# Patient Record
Sex: FEMALE | Race: WHITE
[De-identification: names, ages, dates, MRNs, and addresses within clinical notes are randomized per-mention and may not be internally consistent; named-entity substitution may affect disease eponyms.]

---

## 2017-03-29 ENCOUNTER — HOSPITAL ENCOUNTER (OUTPATIENT)
Dept: HOSPITAL 61 - PCVCIMAG | Age: 47
Discharge: HOME | End: 2017-03-29
Attending: INTERNAL MEDICINE
Payer: COMMERCIAL

## 2017-03-29 DIAGNOSIS — Z95.1: ICD-10-CM

## 2017-03-29 DIAGNOSIS — Z95.5: ICD-10-CM

## 2017-03-29 DIAGNOSIS — I25.10: Primary | ICD-10-CM

## 2017-03-29 PROCEDURE — 93325 DOPPLER ECHO COLOR FLOW MAPG: CPT

## 2017-03-29 PROCEDURE — 93351 STRESS TTE COMPLETE: CPT

## 2018-08-13 ENCOUNTER — HOSPITAL ENCOUNTER (OUTPATIENT)
Dept: HOSPITAL 61 - PCVCIMAG | Age: 48
Discharge: HOME | End: 2018-08-13
Attending: INTERNAL MEDICINE
Payer: COMMERCIAL

## 2018-08-13 DIAGNOSIS — I25.10: Primary | ICD-10-CM

## 2018-08-13 PROCEDURE — A9500 TC99M SESTAMIBI: HCPCS

## 2018-08-13 PROCEDURE — 93017 CV STRESS TEST TRACING ONLY: CPT

## 2018-08-13 PROCEDURE — 78452 HT MUSCLE IMAGE SPECT MULT: CPT

## 2018-08-13 NOTE — PCVCIMAG
--------------- APPROVED REPORT --------------





Imaging Protocol: Rest Tc-99m/Stress Tc-99m 1 day

Study performed:  08/13/2018 08:57:37



Indication: CAD, CARDIOMYOPATHY, FACTOR V LEIDEN, FATIGUE

Patient Location: Out-Patient

Stress Nurse: Elvia Lopez RN, Eleonora Plascencia RN

NM Tech:Shenamehran Melo [facility]



Ht: 5 ft 3 in Wt: 125 lbs BSA:  1.58 m2

HR: 61 bpm                      BP: 117/55 mmHg         BMI:  

22.1

Rhythm:  SR



Medical History

Medical History: CARDIOMYOPATHY, CAD, FATIGUE, FACTOR V 

LEIDEN

Medications: COUMADIN, XANAX, ATORVASTATIN, GABAPENTIN, LOSARTAN, 

METOPROLOL, KCL

Allergies: PCN, SAS, VALIUM

Previous Cardiac Procedures: CABG 2010

Pretest Chest Pain Characteristics: No chest pain

Exercise History: Physically active

Meds Held (24 hrs): METOPROLOL



Resting Data

Rest SPECT myocardial perfusion imaging was performed in supine 

position 60 minutes following the intravenous injection of 10.5 mCi 

of Tc-99m Sestamibi.

Time of rest injection: 0800     Date: 08/13/2018

Administration Route: IV

Administration Site: Left Hand



Pharmacologic Stress

Pharmacologic stress test was performed by injecting Regadenoson 0.4 

mg IV push over 10-15 seconds immediately followed by the intravenous 

injection of 31.5 mCi of Tc-99m Sestamibi.

Time of stress injection: 31.5     Date: 08/13/2018

Administration Route: IV

Administration Site: Left Hand

Gated Stress SPECT was performed 60 minutes after stress 

injection.

The images were gated to evaluate regional wall motion and calculate 

left ventricular ejection fraction. 



Stress Test Details

Stress Test:  Pharmacologic stress testing performed using 0.4 mg of 

regadenoson per 5 mL given IV over 10 seconds and was paired with low 

level exercise.

  Reason for pharmacologic stress test: physical limitation.



HRMax Heart Rate (APMHR): 172 bpm 

Resting HR:            61 bpmTarget HR (85% APMHR): 146 bpm

Max HR Achieved:  100 bpm

% of APMHR:         58

Recovery HR:            60 bpm



BP

Resting BP:  117/55 mmHg



Recovery BP:       117/53 mmHg

ECG

Resting ECG:  SR

Stress ECG:     SR/ST, Sinus Rhythm, nonspecific ST-T 

abnormalities

ST Change: SR

Arrhythmia:    NONE

Recovery ECG: Sinus Rhythm



Clinical

Reason for Termination: Completed protocol

Stress Symptoms: TINGLING OVER ALL

PATIENT RECOVERED WITH CAFFEINE



Stress ECG Conclusion

ECG: Non-ischemic



Study Quality

Study: Fair



Study Data

Post stress, the left ventricular ejection was 58%..

SSS: 9

SRS: 7

SDS: 3

TID = 1.28.



Perfusion

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 

ventricle with no nadia-infarct ischemia. 

Small sized area of moderate reversible ischemia involving the mid 

inferolateral left ventricle consistent with a circumflex 

distribution.



Wall Motion

Normal left ventricular size and function with no regional wall 

motion abnormalities.



Nuclear Conclusion

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 

ventricle with no nadia-infarct ischemia. 

Small sized area of moderate reversible ischemia involving the mid 

inferolateral left ventricle consistent with a circumflex 

distribution.

Normal left ventricular size and function with no regional wall 

motion abnormalities.

Post stress, the left ventricular ejection was 58%. 

No prior study available for comparison.



Interpreted by:  Charlie Puentes MD

Electronically Approved: 08/13/2018 

13:43:38



&lt;Conclusion&gt;

ECG: Non-ischemic

## 2019-01-30 ENCOUNTER — HOSPITAL ENCOUNTER (OUTPATIENT)
Dept: HOSPITAL 61 - PCVCIMAG | Age: 49
Discharge: HOME | End: 2019-01-30
Attending: INTERNAL MEDICINE
Payer: COMMERCIAL

## 2019-01-30 DIAGNOSIS — I42.9: ICD-10-CM

## 2019-01-30 DIAGNOSIS — R07.9: ICD-10-CM

## 2019-01-30 DIAGNOSIS — I08.1: Primary | ICD-10-CM

## 2019-01-30 DIAGNOSIS — Z95.1: ICD-10-CM

## 2019-01-30 DIAGNOSIS — R09.89: ICD-10-CM

## 2019-01-30 DIAGNOSIS — R10.9: ICD-10-CM

## 2019-01-30 DIAGNOSIS — I10: ICD-10-CM

## 2019-01-30 DIAGNOSIS — I25.10: ICD-10-CM

## 2019-01-30 PROCEDURE — 76700 US EXAM ABDOM COMPLETE: CPT

## 2019-01-30 PROCEDURE — 93306 TTE W/DOPPLER COMPLETE: CPT

## 2019-01-30 NOTE — PCVCIMAG
EXAM: ABDOMINAL ULTRASOUND COMPLETE



INDICATION: Abdominal pain



FINDINGS: 

Gallbladder: No gallstones. No wall thickening or abnormal

pericholecystic fluid.  Gallbladder is somewhat small and contracted.

Liver: Normal in size measuring 13.4 cm in length. No focal masses.

Bile ducts: No intra or extra hepatic bile duct dilatation. The common

bile duct measures 4.5 mm.

Pancreas: Unremarkable where seen.

Spleen: Normal in size measuring 9.8 cm in greatest dimension. No

focal masses.

Right kidney: No hydronephrosis. Length measures 9.0 cm.

Left kidney: No hydronephrosis. Length measures 9.0 cm.

Inferior vena cava: Normal in size where seen.

Aorta: Normal in caliber where seen.



IMPRESSION: 

Gallbladder is somewhat small and contracted.  Abdomen otherwise

unremarkable.  No evidence of cholelithiasis.



LOC:OFFICE

## 2019-01-30 NOTE — PCVCIMAG
--------------- APPROVED REPORT --------------





Study performed:  2019 16:22:53



EXAM: Comprehensive 2D, Doppler, and color-flow Echocardiogram

Patient Location: Echo lab

Status:  routine



BSA:         1.61

HR: 74 bpmBP:          90/60 mmHg

Rhythm: NSR



Other Information 

Study Quality: Adequate



Indications

CAD

Cardiomyopathy

Chest Pain

cabg



2D Dimensions

IVSd:  5.59 (7-11mm)

LVDd:  54.38 mm

PWd:  5.35 (7-11mm)

LVDs:  42.90 (25-40mm)

Left Atrium:  36.88 (27-40mm)

Aortic Root:  28.78 mm

LV Single Plane 4CH:  51.43 %

LV Single Plane 2CH:  50.78 %

Biplane EF:  50.2 %



Volumes

Left Atrial Volume (Systole)

Single Plane 4CH:  43.45 mLSingle Plane 2CH:  57.87 mL

LA ESV Index:  34.00 mL/m2



Aortic Valve

AoV Peak Walt.:  1.25 m/s

AO Peak Gr.:  6.25 mmHgLVOT Max P.11 mmHg

LVOT Max V:  1.01 m/s



Mitral Valve

IVRT:  69.20 ms



Pulmonary Valve

PV Peak Walt.:  0.97 m/sPV Peak Gr.:  3.79 mmHg



Pulmonary Vein

P Vein S:    0.39 m/sP Vein A:  0.42 m/s

P Vein D:   0.57 m/sP Vein A Dur.:  162.6 msec

P Vein S/D Ratio:  0.68



Tricuspid Valve

TR Peak Walt.:  2.52 m/s

TR Peak Gr.:  25.47 mmHg

TV Vmax:  0.59 m/s



Left Ventricle

The left ventricle is normal size. There is normal LV segmental wall motion. There is 

normal left ventricular wall thickness. Left ventricular systolic function is normal. The 

left ventricular ejection fraction is within the normal range. LVEF is 45-50%.mild ant 

apical hypokinesis The left ventricular diastolic function is normal.



Right Ventricle

The right ventricle is normal size. The right ventricular systolic function is 

normal.



Atria

The left atrium size is normal. The right atrium size is normal.



Aortic Valve

The aortic valve is normal in structure. No aortic regurgitation is present. There is no 

aortic valvular stenosis.



Mitral Valve

The mitral valve is normal in structure. Mild mitral regurgitation. No evidence of mitral 

valve stenosis.



Tricuspid Valve

The tricuspid valve is normal in structure. Mild tricuspid regurgitation with PAP of 32 

mmHg.



Pulmonic Valve

The pulmonary valve is normal in structure. Trace pulmonic regurgitation.



Great Vessels

The aortic root is normal in size. IVC is normal in size and collapses >50% with 

inspiration.



Pericardium

There is no pericardial effusion. There is no pleural effusion.



<Conclusion>

The left ventricle is normal size.

LVEF is 45-50%.mild ant apical hypokinesis

The right ventricle is normal size.

The left atrium size is normal.

The left atrium size is normal.

The aortic valve is normal in structure.

There is no aortic valvular stenosis.

Mild mitral regurgitation.

Mild mitral regurgitation.

Mild tricuspid regurgitation with PAP of 32 mmHg.

The aortic root is normal in size.

There is no pericardial effusion.

## 2019-12-27 ENCOUNTER — HOSPITAL ENCOUNTER (OUTPATIENT)
Dept: HOSPITAL 61 - PCVCIMAG | Age: 49
Discharge: HOME | End: 2019-12-27
Attending: INTERNAL MEDICINE
Payer: COMMERCIAL

## 2019-12-27 DIAGNOSIS — Z95.1: ICD-10-CM

## 2019-12-27 DIAGNOSIS — Z95.5: ICD-10-CM

## 2019-12-27 DIAGNOSIS — I25.10: ICD-10-CM

## 2019-12-27 DIAGNOSIS — I08.1: Primary | ICD-10-CM

## 2019-12-27 PROCEDURE — 93351 STRESS TTE COMPLETE: CPT

## 2019-12-27 PROCEDURE — 93325 DOPPLER ECHO COLOR FLOW MAPG: CPT

## 2019-12-27 NOTE — PCVCIMAG
--------------- APPROVED REPORT --------------





Study performed:  12/27/2019 12:30:09



Exam:  Stress Echocardiogram

Indication: CAD s/p CABG, CAD s/p PCI, dyspnea, htn

Patient Location: Echo lab

Stress Nurse: Eleonora Plascencia RN

Status: routine



Ht: 5 ft 3 in  

HR: 55 bpm      BP: 90/60 mmHg

Rhythm: Bradycardia



Procedure

The patient underwent an Exercise Stress Test using the Ronnell 

Protocol. Blood pressure, heart rate, and EKG were monitored.

An Echocardiogram was performed by technician in four stages in quad 

fashion.  At peak stress, four selected images were obtained and 

placed side by side with resting images for comparison.



Stress Test Details

Stress Test:  Exercise stress testing was performed using a Ronnell 

protocol.

HR

Resting HR:            55 bpmMax Heart Rate (APMHR): 170 bpm 

Max HR Achieved:  144 bpmTarget HR (85% APMHR): 144 bpm

% of APMHR:         84

Recovery HR:            70 bpm

HR response to stress: Normal HR response to stress



BP

Resting BP:  90/60 mmHg

Max BP:       136/54 mmHg

Recovery BP:       108/56 mmHg

BP response to stress: Normal blood pressure response to 

stress.

ECG

Resting ECG:  Sinus Bradycardia

Stress ECG:     Sinus Rhythm

ST Change: Normal

Arrhythmia:    isolated PVCs

Recovery ECG: Sinus Rhythm

Recovery ST Change: Normal

Recovery Arrhythmia: isolated PVCs that resolved to 

NSR



Clinical

Reason for Termination: Maximal effort

Stress Symptoms: Dyspnea

Exercise duration: 9 min 36 sec

Highest Stage Achieved: Stage 4: 4.2 mph at 16% grade. 

Exercise capacity: 12 METs

Overall Exercise Capacity for Age: Normal

Scale: Active

Angina Score: None



Pre-Stress Echo

The resting Echocardiogram showed normal left ventricular 

contractility with an estimated Ejection Fraction of about 45%. 

Fixed septal hypokinesis.



Post-Stress Echo

The stress Echocardiogram showed normal left ventricular 

contractility with an estimated Ejection Fraction of about 55%. 

Fixed septal hypokinesis.



Clinical

No clinical or ECG evidence for ischemia.



Conclusion

Clinical Response:  Non-ischemic

Exercise Capacity:  Average

Stress ECG Response:  Non-ischemic

Stress Echo Images:  Non-ischemic

The left ventricle is normal in size and wall thickness in both the 

rest and stress images.

Mild mitral regurgitation. Mild tricuspid regurgitation with PAP of 

34 mmHg. Normal aortic and pulmonic valves.



Other Information

Study Quality: Adequate



<Conclusion>

The left ventricle is normal in size and wall thickness in both the 

rest and stress images.

Mild mitral regurgitation. Mild tricuspid regurgitation with PAP of 

34 mmHg. Normal aortic and pulmonic valves.